# Patient Record
Sex: FEMALE | Race: WHITE | URBAN - METROPOLITAN AREA
[De-identification: names, ages, dates, MRNs, and addresses within clinical notes are randomized per-mention and may not be internally consistent; named-entity substitution may affect disease eponyms.]

---

## 2017-06-01 ENCOUNTER — TELEPHONE (OUTPATIENT)
Dept: FAMILY MEDICINE | Facility: CLINIC | Age: 37
End: 2017-06-01

## 2017-06-01 NOTE — TELEPHONE ENCOUNTER
Pt is past due for f/u pap smear.  Reminder letter was sent 05/19/17 and was returned.  LMTC and schedule at [facility] (formerly [facility]).  Left this writer's number in case of questions ([phone]).  If no reply and/or appt within 2 weeks (06/15/17) pt will be considered lost to pap tracking f/u.  Amie Bermudez,    Pap Tracking